# Patient Record
Sex: MALE | Race: WHITE | NOT HISPANIC OR LATINO | URBAN - METROPOLITAN AREA
[De-identification: names, ages, dates, MRNs, and addresses within clinical notes are randomized per-mention and may not be internally consistent; named-entity substitution may affect disease eponyms.]

---

## 2017-07-13 ENCOUNTER — RECORDS - HEALTHEAST (OUTPATIENT)
Dept: LAB | Facility: CLINIC | Age: 52
End: 2017-07-13

## 2017-07-13 LAB
CHOLEST SERPL-MCNC: 173 MG/DL
FASTING STATUS PATIENT QL REPORTED: NORMAL
HDLC SERPL-MCNC: 48 MG/DL
LDLC SERPL CALC-MCNC: 98 MG/DL
PSA SERPL-MCNC: 0.3 NG/ML (ref 0–3.5)
TRIGL SERPL-MCNC: 133 MG/DL

## 2019-06-20 ENCOUNTER — RECORDS - HEALTHEAST (OUTPATIENT)
Dept: ADMINISTRATIVE | Facility: OTHER | Age: 54
End: 2019-06-20

## 2019-06-20 ENCOUNTER — AMBULATORY - HEALTHEAST (OUTPATIENT)
Dept: VASCULAR SURGERY | Facility: CLINIC | Age: 54
End: 2019-06-20

## 2019-06-20 DIAGNOSIS — I83.892 VARICOSE VEINS OF LEG WITH SWELLING, LEFT: ICD-10-CM

## 2019-08-16 ENCOUNTER — AMBULATORY - HEALTHEAST (OUTPATIENT)
Dept: VASCULAR SURGERY | Facility: CLINIC | Age: 54
End: 2019-08-16

## 2019-08-20 ENCOUNTER — RECORDS - HEALTHEAST (OUTPATIENT)
Dept: VASCULAR ULTRASOUND | Facility: CLINIC | Age: 54
End: 2019-08-20

## 2019-08-20 ENCOUNTER — OFFICE VISIT - HEALTHEAST (OUTPATIENT)
Dept: VASCULAR SURGERY | Facility: CLINIC | Age: 54
End: 2019-08-20

## 2019-08-20 DIAGNOSIS — I87.2 VENOUS INSUFFICIENCY (CHRONIC) (PERIPHERAL): ICD-10-CM

## 2019-08-20 DIAGNOSIS — I83.899 VARICOSE VEINS OF UNSPECIFIED LOWER EXTREMITY WITH OTHER COMPLICATIONS: ICD-10-CM

## 2019-08-20 DIAGNOSIS — R60.9 SWELLING: ICD-10-CM

## 2019-08-20 DIAGNOSIS — I87.2 VENOUS INSUFFICIENCY OF BOTH LOWER EXTREMITIES: ICD-10-CM

## 2019-08-20 DIAGNOSIS — R60.9 EDEMA, UNSPECIFIED: ICD-10-CM

## 2019-08-20 DIAGNOSIS — I83.899 SYMPTOMATIC VARICOSE VEINS: ICD-10-CM

## 2019-08-20 ASSESSMENT — MIFFLIN-ST. JEOR: SCORE: 1990.65

## 2019-11-26 ENCOUNTER — OFFICE VISIT - HEALTHEAST (OUTPATIENT)
Dept: VASCULAR SURGERY | Facility: CLINIC | Age: 54
End: 2019-11-26

## 2019-11-26 ENCOUNTER — COMMUNICATION - HEALTHEAST (OUTPATIENT)
Dept: TELEHEALTH | Facility: CLINIC | Age: 54
End: 2019-11-26

## 2019-11-26 DIAGNOSIS — I87.2 VENOUS INSUFFICIENCY OF BOTH LOWER EXTREMITIES: ICD-10-CM

## 2019-11-26 DIAGNOSIS — I83.893 SYMPTOMATIC VARICOSE VEINS OF BOTH LOWER EXTREMITIES: ICD-10-CM

## 2019-11-26 RX ORDER — ATORVASTATIN CALCIUM 20 MG/1
20 TABLET, FILM COATED ORAL AT BEDTIME
Status: SHIPPED | COMMUNITY
Start: 2019-11-26

## 2019-12-17 ENCOUNTER — RECORDS - HEALTHEAST (OUTPATIENT)
Dept: LAB | Facility: CLINIC | Age: 54
End: 2019-12-17

## 2019-12-17 LAB
ALBUMIN SERPL-MCNC: 4.2 G/DL (ref 3.5–5)
ALP SERPL-CCNC: 79 U/L (ref 45–120)
ALT SERPL W P-5'-P-CCNC: 23 U/L (ref 0–45)
ANION GAP SERPL CALCULATED.3IONS-SCNC: 7 MMOL/L (ref 5–18)
AST SERPL W P-5'-P-CCNC: 61 U/L (ref 0–40)
BILIRUB SERPL-MCNC: 0.6 MG/DL (ref 0–1)
BUN SERPL-MCNC: 18 MG/DL (ref 8–22)
CALCIUM SERPL-MCNC: 9.4 MG/DL (ref 8.5–10.5)
CHLORIDE BLD-SCNC: 105 MMOL/L (ref 98–107)
CHOLEST SERPL-MCNC: 160 MG/DL
CO2 SERPL-SCNC: 28 MMOL/L (ref 22–31)
CREAT SERPL-MCNC: 1.8 MG/DL (ref 0.7–1.3)
FASTING STATUS PATIENT QL REPORTED: NORMAL
GFR SERPL CREATININE-BSD FRML MDRD: 40 ML/MIN/1.73M2
GLUCOSE BLD-MCNC: 84 MG/DL (ref 70–125)
HDLC SERPL-MCNC: 53 MG/DL
LDLC SERPL CALC-MCNC: 81 MG/DL
POTASSIUM BLD-SCNC: 4.2 MMOL/L (ref 3.5–5)
PROT SERPL-MCNC: 6.9 G/DL (ref 6–8)
SODIUM SERPL-SCNC: 140 MMOL/L (ref 136–145)
TRIGL SERPL-MCNC: 130 MG/DL

## 2019-12-19 ENCOUNTER — COMMUNICATION - HEALTHEAST (OUTPATIENT)
Dept: VASCULAR SURGERY | Facility: CLINIC | Age: 54
End: 2019-12-19

## 2020-01-02 ENCOUNTER — RECORDS - HEALTHEAST (OUTPATIENT)
Dept: VASCULAR ULTRASOUND | Facility: CLINIC | Age: 55
End: 2020-01-02

## 2020-01-02 DIAGNOSIS — I83.893 VARICOSE VEINS OF BILATERAL LOWER EXTREMITIES WITH OTHER COMPLICATIONS: ICD-10-CM

## 2020-01-02 DIAGNOSIS — I87.2 VENOUS INSUFFICIENCY (CHRONIC) (PERIPHERAL): ICD-10-CM

## 2020-01-21 ENCOUNTER — OFFICE VISIT - HEALTHEAST (OUTPATIENT)
Dept: VASCULAR SURGERY | Facility: CLINIC | Age: 55
End: 2020-01-21

## 2020-01-21 ENCOUNTER — COMMUNICATION - HEALTHEAST (OUTPATIENT)
Dept: VASCULAR SURGERY | Facility: CLINIC | Age: 55
End: 2020-01-21

## 2020-01-21 DIAGNOSIS — I83.893 SYMPTOMATIC VARICOSE VEINS OF BOTH LOWER EXTREMITIES: ICD-10-CM

## 2020-01-21 DIAGNOSIS — I87.2 VENOUS INSUFFICIENCY OF BOTH LOWER EXTREMITIES: ICD-10-CM

## 2020-05-05 ENCOUNTER — RECORDS - HEALTHEAST (OUTPATIENT)
Dept: LAB | Facility: CLINIC | Age: 55
End: 2020-05-05

## 2020-05-05 LAB
ALBUMIN SERPL-MCNC: 4.4 G/DL (ref 3.5–5)
ANION GAP SERPL CALCULATED.3IONS-SCNC: 9 MMOL/L (ref 5–18)
BUN SERPL-MCNC: 18 MG/DL (ref 8–22)
CALCIUM SERPL-MCNC: 9.4 MG/DL (ref 8.5–10.5)
CHLORIDE BLD-SCNC: 103 MMOL/L (ref 98–107)
CO2 SERPL-SCNC: 27 MMOL/L (ref 22–31)
CREAT SERPL-MCNC: 1.83 MG/DL (ref 0.7–1.3)
GFR SERPL CREATININE-BSD FRML MDRD: 39 ML/MIN/1.73M2
GLUCOSE BLD-MCNC: 74 MG/DL (ref 70–125)
PHOSPHATE SERPL-MCNC: 3.8 MG/DL (ref 2.5–4.5)
POTASSIUM BLD-SCNC: 4 MMOL/L (ref 3.5–5)
SODIUM SERPL-SCNC: 139 MMOL/L (ref 136–145)
TSH SERPL DL<=0.005 MIU/L-ACNC: 101.55 UIU/ML (ref 0.3–5)

## 2020-06-18 ENCOUNTER — RECORDS - HEALTHEAST (OUTPATIENT)
Dept: LAB | Facility: CLINIC | Age: 55
End: 2020-06-18

## 2020-06-18 LAB — TSH SERPL DL<=0.005 MIU/L-ACNC: 4.33 UIU/ML (ref 0.3–5)

## 2021-05-26 VITALS
SYSTOLIC BLOOD PRESSURE: 120 MMHG | HEART RATE: 72 BPM | RESPIRATION RATE: 12 BRPM | TEMPERATURE: 97.9 F | DIASTOLIC BLOOD PRESSURE: 70 MMHG

## 2021-05-27 VITALS
RESPIRATION RATE: 12 BRPM | DIASTOLIC BLOOD PRESSURE: 70 MMHG | SYSTOLIC BLOOD PRESSURE: 112 MMHG | HEART RATE: 72 BPM | TEMPERATURE: 97.9 F

## 2021-05-31 NOTE — PROGRESS NOTES
This is a new consult for Varicose Veins. The patient has varicose veins that are problematic in bilateral legs. Symptoms patient has been experiencing are  swelling. Patient has not been wearing compression stockings. Patient has not had recent imaging on legs done.'

## 2021-06-03 VITALS — BODY MASS INDEX: 27.16 KG/M2 | HEIGHT: 77 IN | WEIGHT: 230 LBS

## 2021-06-03 NOTE — PATIENT INSTRUCTIONS - HE
Varicose Vein Pre-Procedure Instructions    You are scheduled for a varicose vein treatment on your legs. The following is some helpful information for you in regards to your treatment.    **Important:  A  will be needed post procedure. We will supply a thigh high compression stocking for you unless if you have one.  Please be aware, it is not advised to fly within 3 weeks post procedure    Please wear comfortable clothing.  We recommend that you bring a change of under clothes; they may get stained by the cleansing solution.    Feel free to bring a personal music player or a CD to listen to during your procedure.    Take your routine medications as you normally would.    It is ok to eat prior to this procedure.    Please allow 1- 2 hours for your appointment.    For any questions regarding your procedure please call   [phone] to speak with the nurse.    If you would like a Good Nyla Estimate for your upcoming service/procedure contact Cost of Care Estimates at [phone], advocates are available Monday through Friday 8am - 5pm.  [id number] codes for veins if want to only do left code [id number]    Please have the following information available:  1. Patient name and date of birth  2. Insurance company, plan name, ID and group numbers  3. Description of the service/procedure and the associated procedure code numbers, if available. If more than one (1) procedure code, indicate which will be the primary procedure code.   4. The facility where the service/procedure will be performed.  5. The name of the physician involved with the service/procedure.  6. Appointment date of service.  7. Telephone number to call with the information.

## 2021-06-03 NOTE — PROGRESS NOTES
3 month f/u compression stockings with continues S/S symp VV,  - bilateral  GSV candidate for RFA reviewed.  Will preauth through [employer].L>R symp VV. May only do the left GSV RFA

## 2021-06-04 NOTE — TELEPHONE ENCOUNTER
Message left fot patient regarding vein ablation procedure next week. Advised to bring a . Explained it is ok to eat and drink prior to procedure with exception of blood thinner. Verified patient's insurance. We will supply patient with a thigh high compression sock. We carry from size medium to extra large. If patient doesn't fit into them they will need to provide their own.. Patient will call if any further questions.

## 2021-06-05 NOTE — PROGRESS NOTES
left GSV RFA f/u. DOS 12/30 vein continues but is improving  Resume normal activity with exception of no flight for one more week.  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  Return to clinic in 4 months.

## 2021-06-05 NOTE — PATIENT INSTRUCTIONS - HE
Resume normal activity  Use compression socks as much as possible when on feet, long car rides and on air planes.  Return to clinic in 4 months.  Call if any questions [phone]

## 2021-06-05 NOTE — PROGRESS NOTES
Post Procedure Note Endovenous Closure    S: Freddy Jones is a 54 y.o. male S/P Bilateral  leg endovenous closure ofBilateral lower ext. Two weeks out from procedure. Doing well, wore male  thigh high socks. Minimal discomfort. Some superficial phlibitis. Which is resolving.     O:   Vitals:    01/21/20 1208   BP: 112/70   Pulse: 72   Resp: 12   Temp: 97.9  F (36.6  C)       General: no apparent distress  Legs look good no signs of infection, incisions healing nicely.    US Venous Post Ablation Legs Bilateral (Order [id number])   Imaging   Date: 1/2/2020 Department: [facility]ound [place] Released By: Ira Potter Authorizing: Guille Porter MD   Study Result     LOCATION: [facility]  DATE: 1/2/2020     EXAM: US VENOUS POST ABLATION LEGS BILATERAL      INDICATION: Symptomatic varicose veins status post endovenous ablation.     COMPARISON: Venous insufficiency study dated 08/20/2019     TECHNIQUE: Duplex imaging is performed utilizing gray-scale, two-dimensional images, and color-flow imaging. Doppler waveform analysis and spectral Doppler imaging is also performed.     FINDINGS:   The left great saphenous vein is occluded beginning 2 mm from the saphenofemoral junction and extending to the proximal calf. Patent saphenofemoral junction.         A/P: S/p endovenous closure. For insuffiencey of veins     May switch to knee high support socks   Resume all activities   RTC 4 months   Call for any questions or concerns     Guille Porter MD   [facility]

## 2021-06-17 NOTE — PATIENT INSTRUCTIONS - HE
"Patient Instructions by Dione Moreno LPN at 8/20/2019  8:40 AM     Author: Dione Moreno LPN Service: -- Author Type: Licensed Nurse    Filed: 8/20/2019  9:06 AM Encounter Date: 8/20/2019 Status: Signed    : Dione Moreno LPN (Licensed Nurse)       We are prescribing some compression stockings for you. I have included different suppliers that should help you get measured and fitting to ensure proper fitting socks. You should wear these socks as much as you can. It is especially important to wear them with long periods of sitting/standing, long car rides or if you will be flying. Compression socks should get replaced every 4-6 months. They do not need to be worn at night while in bed.    If you do a lot of standing it is good to do calf raises to help keep the blood pumping. If you sit a lot at work it is good to get up periodically to walk around. Elevation of the foot of your bed 4-6\" helps the blood return back to where it is needed.    We would like you to follow up in 3 months after wearing compression socks to see how you are doing. Below is a list of locations where you can  the stockings.    Please call one of the locations below to schedule an appointment to get fitted for your compression stockings. If you received a prescription please bring it with you to your appointment. Some locations are limited to what they carry.    [facility]  [address]   [place] [address]   Phone: [phone]   Fax: [phone]    [facility]    [facility]  [address]  [place] [address]  Phone: [phone]    [facility]   [address] ([facility])  [place] [address]  Phone: [phone]    [facility]  [address]  [address]   [place] [address]   Phone: " [phone]    [facility]   [address]             [address]                 [address]     [place] [address]      [place] [address]         [place] [address]  Phone: [phone]      Phone: [phone]            Phone: [phone]  Fax: [phone]          Fax:[phone]                 Fax: [phone]                                     Naz Crispin  [phone]  [email or web address]      Understanding Spider and Varicose Veins    Do you often hide your legs because of the way they look? You may have noticed tiny red or blue bursts (spider veins). Or maybe you have veins that bulge or look twisted (varicose veins). If so, there are treatments that can help.    What Are the Symptoms?  Spider veins or varicose veins may never be a problem. But sometimes they can cause legs to ache or swell. Your legs may also feel heavy and tired, or like theyre burning. These symptoms may be more severe at the end of the day. Prolonged sitting or standing can also make your symptoms worse.        Who Gets Spider and Varicose Veins?  Anyone can get spider or varicose veins. But vein problems tend to be hereditary (run in families). Other factors that can affect veins include:    Pregnancy, hormones, and birth control pills    A job where you stand or sit a lot    Extra weight or lack of exercise    Age    What Can Be Done?  Spider and varicose veins can affect the way you feel about yourself. Talk to your doctor about your concerns. There are treatments that can ease symptoms and make your legs look better.  Your Treatment Options  Treatment may include self-care, sclerotherapy (injecting veins with a chemical), or surgery. Spider veins and some varicose veins can be treated with sclerotherapy. Large varicose veins may need surgery.    5870-8641 [employer]. [address] [place] [address], [place] [address]. All rights reserved. This information is not  intended as a substitute for professional medical care. Always follow your healthcare professional's instructions.      Ultrasound    Thank you for choosing [facility] as partners in your care.  Please read the following information before your appointment.  Feel free to ask questions.    An ultrasound is an exam that uses sound waves to create pictures.  The special camera that takes the pictures is called a transducer.  An ultrasound technologist will perform the exam under the direction of a physician.    Preparation  Ultrasound preps vary.  If you are scheduled for an Aorta Ultrasound, please do not eat or drink anything 8 hours before your exam.  You may take daily medications with a small sip of water.  There is no preparation required for any of the other ultrasound exams performed at [facility].    The Examination  You may or may not need to change clothes for your exam.  The technologist will explain the exam to you.  He or she will ask you a few questions and answer any questions you may have.    You will lie on a table and a gel will be applied to your skin.  A small handheld instrument called a transducer will be moved across the area we are looking at while pictures are taken.  Also, your exam may include measuring your blood pressure on your arms, legs and/or toes.    When the Exam Is Completed  Your physician will receive the results of the exam and explain them to you.  You may return to your normal diet and activities unless otherwise directed by your doctor.  Feel free to ask questions if something is not clear to you.  We welcome comments.    At [facility], we are dedicated to providing the best possible care.  Thank you for taking time to read these instructions.  We hope your experience is as pleasant as possible.      You are scheduled for the following exam(s):    [x]Venous Duplex:  Evaluates the veins that carry blood to the heart from the arms and/or legs.   Gel is applied to the skin of the arms and/or legs.  A transducer will be placed on the gel covered areas to obtain images and evaluate flow in the veins.  This exam takes approximately 30 minutes per arm/leg.    Self-Care for Spider and Varicose Veins    Your doctor may suggest that you try self-care. Exercising and maintaining a healthy weight may keep problem veins from getting worse. Wearing elastic stockings and elevating your legs can help improve blood flow. Taking breaks when you sit or stand helps, too.  Exercising        Exercising is good for your veins because it improves blood flow. Walking, cycling, or swimming are great exercises for vein health. But be sure to check with your doctor before starting any exercise program. Also, keep these hints in mind:    When exercising, start out slowly and try to build up to 30 minutes on most days.    Elevate your legs above heart level after exercise to keep blood from pooling in veins.  Maintaining a healthy weight  Being overweight puts extra pressure on your veins. To maintain a healthy weight, try these tips:    Choose lean meats, fish, and skinless chicken.    Use low-fat dairy products.    Eat foods high in fiber, such as whole grains, fruits, and vegetables.    Cut down on sugar, salt, and fats such as butter.    Exercise regularly.  Wearing elastic stockings  Elastic stockings gently squeeze veins so blood flows upward. If you need elastic stockings, your doctor can prescribe them for you. Follow your doctors advice about how and when to wear them. Elastic stockings should fit snugly.  Elevating your legs  Raising your legs above heart level will help relieve swelling and keep blood from pooling in veins. Try to elevate your legs for 15 to 20 minutes at the end of the day, and whenever youre relaxing. To make sure your legs are raised above heart level, prop them up on cushions or large pillows.  When sitting and standing  To keep blood moving when you  have to sit or stand for long periods, try these tips:    At work, take walking breaks instead of coffee breaks. Walk during your lunch hour. Or try flexing your feet up and down 10 times each hour.    When standing, raise yourself up and down on your toes, or rock back and forth on your heels.    8922-5366 [employer]. [address] [place] [address], [place] [address]. All rights reserved. This information is not intended as a substitute for professional medical care. Always follow your healthcare professional's instructions.

## 2021-06-19 NOTE — LETTER
Letter by Guille Porter MD at      Author: Guille Porter MD Service: -- Author Type: --    Filed:  Encounter Date: 11/26/2019 Status: Signed         Aime Nielsen MD  [address]  [facility] [address]                                  November 26, 2019    Patient: Freddy Jones   MR Number: [id number]   YOB: 1965   Date of Visit: 11/26/2019     Dear Dr. Gia MD:    Thank you for referring Freddy Jones to me for evaluation. Below are the relevant portions of my assessment and plan of care.    If you have questions, please do not hesitate to call me. I look forward to following Freddy along with you.    Sincerely,        Guille Porter MD          CC  No Recipients  Guille Porter MD  11/26/2019  9:40 AM  Sign when Signing Visit  Follow Up: Varicose Veins/ Venous Insufficiency    Freddy Jones is a 54 y.o.  male here for followup. He has worn stockings now for 3 months. I saw them previously in August 2019.  Continued progression of disease and symptoms and issues; reviewed ultrasound results. Patient has ongoing symptoms with pain and swelling needing intervention with pain meds secondary to interfering with daily activities and work. This now inhibits daily chores and activities.     Allergies:Patient has no known allergies.    Past Medical History:   Diagnosis Date   ? Chronic kidney disease    ? Hyperlipidemia    ? Lichen planus        Past Surgical History:   Procedure Laterality Date   ? RENAL BIOPSY     ? VASECTOMY  2002       CURRENT MEDS:  Current Outpatient Medications on File Prior to Visit   Medication Sig Dispense Refill   ? aspirin 81 mg chewable tablet Chew 81 mg daily.     ? atorvastatin (LIPITOR) 20 MG tablet Take 20 mg by mouth at bedtime.     ? cholecalciferol, vitamin D3, 1,000 unit tablet Take 1,000 Units by mouth daily.     ? OMEGA-3/DHA/EPA/FISH OIL (FISH OIL-OMEGA-3 FATTY ACIDS) 300-1,000 mg capsule Take 1,200 mg by mouth daily.      ? [DISCONTINUED] atorvastatin (LIPITOR) 20 MG tablet Take 20 mg by mouth bedtime.     ? [DISCONTINUED] lisinopril (PRINIVIL,ZESTRIL) 5 MG tablet Take 5 mg by mouth daily.       No current facility-administered medications on file prior to visit.        Family History   Problem Relation Age of Onset   ? Varicose Veins Mother    ? Diabetes Father    ? Varicose Veins Sister         reports that he has never smoked. He has never used smokeless tobacco. He reports current alcohol use.    Review of Systems:  Negative except continued progression of vein disease and symptoms of he has varicosities left much greater than right.  He has big large dilated varicosities entire leg on the left side with more swelling and issues there.  He has it on both sides causes pain symptoms especially stands for long length of time.  Socks have given him some relief but do not give any relief or above the sock areas in this area's continue to progress.  Causes troubles he stands for any length of time.  His normal activities at home and activities and chores of home life.  Such as washing dishes.  He is worn socks he wants to proceed with something type of intervention possible.  With and is here for that follow-up.  Otherwise normal state of health.  Rates his pain as a 6 out of 10    OBJECTIVE:  Vitals:    11/26/19 0804   BP: 120/70   Pulse: 72   Resp: 12   Temp: 97.9  F (36.6  C)     There is no height or weight on file to calculate BMI.    EXAM:  GENERAL: This is a well-developed 54 y.o. male who appears his stated age  HEAD: normocephalic  HEENT: Pupils equal and reactive bilaterally  CARDIAC: RRR without murmur  CHEST/LUNG:  Clear to auscultation  ABDOMEN: Soft, nontender, nondistended, no masses    NEUROLOGIC: Focally intact, nonfocal  VASCULAR: Pulses intact, symmetrical upper and lower extremities. Varicose veins, Spider veins and Chronic venous stasis changes, bilateral                    Imaging:    US Venous Insufficiency  Legs Bilateral (Order [id number])   Imaging   Date: 8/20/2019 Department: [facility] Ultrasound [place] Released By: Denton Ellison RDMS, RVT Authorizing: Guille Porter MD   Study Result     [facility]     DATE: 8/20/2019     EXAM: BILATERAL LOWER EXTREMITY DEEP AND SUPERFICIAL VENOUS DUPLEX ULTRASOUND WITH PHYSIOLOGIC TESTING     INDICATION: Symptomatic varicose veins. Assess for incompetent veins.     TECHNIQUE: Supine and upright ultrasound of the deep and superficial veins with Valsalva and compression augmentation maneuvers. Duplex imaging is performed utilizing gray-scale, two-dimensional images, color-flow imaging, Doppler waveform analysis, and   spectral Doppler imaging.      INCOMPETENCY CRITERIA: Deep vein reflux reported when greater than 1,000 ms flow reversal.  Superficial vein reflux reported when greater than 500 ms flow reversal.  vein reflux reported as greater than 350 ms flow reversal.     DEEP VEIN FINDINGS:     RIGHT LEG: The common femoral, profunda femoral, femoral, popliteal, and visualized calf veins are patent and compressible.  Deep venous reflux noted within the common femoral vein.     LEFT LEG:  The common femoral, profunda femoral, femoral, popliteal, and visualized calf veins are patent and compressible.   Deep venous reflux noted within the common femoral vein.     RIGHT SUPERFICIAL VEIN FINDINGS:  GREAT SAPHENOUS VEIN: Superficial venous reflux at the saphenofemoral junction. The vein measures 7 mm within this distribution. Superficial venous reflux within the greater saphenous vein at the proximal thigh and knee. The vein measures 4 to 6 mm   within this distribution.     SMALL SAPHENOUS VEIN: Competent from the saphenopopliteal junction to the mid calf.     No incompetent perforating veins or abnormal accessory veins identified.     LEFT SUPERFICIAL VEIN FINDINGS:  GREAT SAPHENOUS VEIN: Superficial venous reflux at the  saphenofemoral junction. The vein measures 9 mm within this distribution. Superficial venous reflux within the greater saphenous vein at the level of the proximal thigh and mid calf. The vein   measures 4 to 7 mm within this distribution.     SMALL SAPHENOUS VEIN: Competent from the saphenopopliteal junction to the mid calf.     No incompetent perforating veins or abnormal accessory veins identified.     IMPRESSION:   CONCLUSION:   1.  No deep venous thrombosis of either lower extremity.  2.  RIGHT LEG: Deep and superficial venous reflux, as described.  3.  LEFT LEG: Deep and superficial venous reflux, as described.         Assessment/Plan:    He has  incompetency and insuffiencey of the Bilateral  Greater  saphenous vein.  Right measures 6.9 mm at the junction, left measures 9.3 mm. Deep systems are intact. No DVTs. Great candidate for endovenous  closure. We spent counseling time more than 50% of visit: 20 minutes today and discussed the procedure. The risks of anesthesia, infection, bleeding, clotting, DVTs, numbess at the insertion site dermatome and  the process and procedure were discussed. Answered all questions today. Will submit this to Freddy Jones's insurance if needed for pre approval.Will set this up when approved. Discussed need to have a  and procedure woul take around 30 minutes with total time 2-3 hours. Also understands a small screening ultrasound 2-3 days out to rule out clot formation at the closed vessel.    DIAGNOSIS: Venous insufficiency of the  right greater saphenous vein and left greater saphenous vein      PROCEDURE: Endovenous closure of the right greater saphenous vein and left greater saphenous vein    Guille Porter MD  [facility] Surgery Dept.

## 2021-06-19 NOTE — LETTER
Letter by Guille Porter MD at      Author: Guille Porter MD Service: -- Author Type: --    Filed:  Encounter Date: 8/20/2019 Status: (Other)         Aime Nielsen MD  [address]  [place] [address]                                  August 26, 2019    Patient: Freddy Jones   MR Number: [id number]   YOB: 1965   Date of Visit: 8/20/2019     Dear Dr. Gia MD:    Thank you for referring Freddy Jones to me for evaluation. Below are the relevant portions of my assessment and plan of care.    If you have questions, please do not hesitate to call me. I look forward to following Freddy along with you.    Sincerely,        Guille Porter MD          CC  No Recipients  Guille Porter MD  8/26/2019  8:53 PM  Sign at close encounter  [facility]      Assessment:     1. varicose veins, bilateral, left greater than right  2. spider veins, bilateral, left greater than right  3. Swelling bilateral left greater than right  3. Insuffiencey of right greater saphenous vein, left greater saphenous vein       Plan:     1. Treatment options of conservative therapy of stockings use, exercise, weight loss, elevating legs  when possible.    2. Script for compression stockings 20-30 mm hg  3. Ultrasound to evaluate legs for incompetency of both deep and superficial system .   4. Surgical treatment   Endovenous closure ofbilateral, greater saphenous vein   Risks and benefits of surgical intervention including infection, burns, dvt, thrombophlebitis,  not closing, recurrence, numbness and nerve injury and need for further intervention were all  discused    5. Follow up: 3 months.   6. Call for any questions concerns or issues    Subjective:      Freddy Jones is a 54 y.o. male  who was referred by Aime Nielsen MD  for evaluation of varicose veins. Symptoms include pain, aching, fatigue, burning, edema and dermatitis. Patient has history of leg selling, pain  "and vein issues that have progressed. Pain and symptoms have affected daily living and work activities needing medications. Here for evaluation today. no stocking or compression devic use    Allergies:Patient has no known allergies.    Past Medical History:   Diagnosis Date   ? Chronic kidney disease    ? Hyperlipidemia    ? Lichen planus        Past Surgical History:   Procedure Laterality Date   ? RENAL BIOPSY     ? VASECTOMY  2002       Current Outpatient Medications   Medication Sig   ? aspirin 81 mg chewable tablet Chew 81 mg daily.   ? atorvastatin (LIPITOR) 20 MG tablet Take 20 mg by mouth bedtime.   ? cholecalciferol, vitamin D3, 1,000 unit tablet Take 1,000 Units by mouth daily.   ? lisinopril (PRINIVIL,ZESTRIL) 5 MG tablet Take 5 mg by mouth daily.   ? OMEGA-3/DHA/EPA/FISH OIL (FISH OIL-OMEGA-3 FATTY ACIDS) 300-1,000 mg capsule Take 1,200 mg by mouth daily.       Family History   Problem Relation Age of Onset   ? Varicose Veins Mother    ? Diabetes Father    ? Varicose Veins Sister         reports that he has never smoked. He has never used smokeless tobacco. He reports that he drinks alcohol.      Review of Systems  Pertinent items are noted in HPI.  A 12 point comprehensive review of systems was negative except as noted.      Objective:     Vitals:    08/20/19 0838   BP: 112/80   Pulse: (!) 56   Resp: 16   Temp: 98.2  F (36.8  C)   TempSrc: Oral   Weight: (!) 230 lb (104.3 kg)   Height: 6' 5\" (1.956 m)     Body mass index is 27.27 kg/m .    EXAM:  GENERAL: This is a well-developed 54 y.o. male who appears his stated age  HEAD: normocephalic  HEENT: Pupils equal and reactive bilaterally  MOUTH: mucus membranes intact. Normal dentation  CARDIAC: RRR without murmur  CHEST/LUNG:  Clear to auscultation bilaterally  ABDOMEN: Soft, nontender, nondistended, no masses noted   NEUROLOGIC: Focally intact, nonfocal, alert and oriented x 3  INTEGUMENT: No open lesions or ulcers  VASCULAR: Pulses intact, symmetrical " upper and lower extremities. There areskin changes consistent with chronic venous insufficiency. Varicose veins present in bilateral greater saphenous distribution. Spider veins present bilateral.                    Imaging:    US Venous Insufficiency Legs Bilateral (Order [id number])   Imaging   Date: 8/20/2019 Department: [facility] Ultrasound [place] Released By: Denton Ellison RDMS, RVT Authorizing: Guille Porter MD   Study Result     [facility] OUTPATIENT     DATE: 8/20/2019     EXAM: BILATERAL LOWER EXTREMITY DEEP AND SUPERFICIAL VENOUS DUPLEX ULTRASOUND WITH PHYSIOLOGIC TESTING     INDICATION: Symptomatic varicose veins. Assess for incompetent veins.     TECHNIQUE: Supine and upright ultrasound of the deep and superficial veins with Valsalva and compression augmentation maneuvers. Duplex imaging is performed utilizing gray-scale, two-dimensional images, color-flow imaging, Doppler waveform analysis, and   spectral Doppler imaging.      INCOMPETENCY CRITERIA: Deep vein reflux reported when greater than 1,000 ms flow reversal.  Superficial vein reflux reported when greater than 500 ms flow reversal.  vein reflux reported as greater than 350 ms flow reversal.     DEEP VEIN FINDINGS:     RIGHT LEG: The common femoral, profunda femoral, femoral, popliteal, and visualized calf veins are patent and compressible.  Deep venous reflux noted within the common femoral vein.     LEFT LEG:  The common femoral, profunda femoral, femoral, popliteal, and visualized calf veins are patent and compressible.   Deep venous reflux noted within the common femoral vein.     RIGHT SUPERFICIAL VEIN FINDINGS:  GREAT SAPHENOUS VEIN: Superficial venous reflux at the saphenofemoral junction. The vein measures 7 mm within this distribution. Superficial venous reflux within the greater saphenous vein at the proximal thigh and knee. The vein measures 4 to 6 mm   within this distribution.     SMALL  SAPHENOUS VEIN: Competent from the saphenopopliteal junction to the mid calf.     No incompetent perforating veins or abnormal accessory veins identified.     LEFT SUPERFICIAL VEIN FINDINGS:  GREAT SAPHENOUS VEIN: Superficial venous reflux at the saphenofemoral junction. The vein measures 9 mm within this distribution. Superficial venous reflux within the greater saphenous vein at the level of the proximal thigh and mid calf. The vein   measures 4 to 7 mm within this distribution.     SMALL SAPHENOUS VEIN: Competent from the saphenopopliteal junction to the mid calf.     No incompetent perforating veins or abnormal accessory veins identified.     IMPRESSION:   CONCLUSION:   1.  No deep venous thrombosis of either lower extremity.  2.  RIGHT LEG: Deep and superficial venous reflux, as described.  3.  LEFT LEG: Deep and superficial venous reflux, as described.         Guille Porter MD  [facility] Surgery Dept.

## 2021-06-20 NOTE — LETTER
Letter by Guille Porter MD at      Author: Guille Porter MD Service: -- Author Type: --    Filed:  Encounter Date: 1/21/2020 Status: (Other)         Aime Nielsen MD  [address]  [place] [facility] [address]                                  January 22, 2020    Patient: Freddy Jones   MR Number: [id number]   YOB: 1965   Date of Visit: 1/21/2020     Dear Dr. Gia MD:    Thank you for referring Freddy Jones to me for evaluation. Below are the relevant portions of my assessment and plan of care.    If you have questions, please do not hesitate to call me. I look forward to following Freddy along with you.    Sincerely,        Guille Porter MD          CC  No Recipients  Guille Porter MD  1/22/2020  3:41 PM  Sign when Signing Visit  Post Procedure Note Endovenous Closure    S: Freddy Jones is a 54 y.o. male S/P Bilateral  leg endovenous closure ofBilateral lower ext. Two weeks out from procedure. Doing well, wore male  thigh high socks. Minimal discomfort. Some superficial phlibitis. Which is resolving.     O:   Vitals:    01/21/20 1208   BP: 112/70   Pulse: 72   Resp: 12   Temp: 97.9  F (36.6  C)       General: no apparent distress  Legs look good no signs of infection, incisions healing nicely.    US Venous Post Ablation Legs Bilateral (Order [id number])   Imaging   Date: 1/2/2020 Department: [facility] Ultrasound [place] Released By: Ira Potter Authorizing: Guille Porter MD   Study Result     LOCATION: [facility]  DATE: 1/2/2020     EXAM: US VENOUS POST ABLATION LEGS BILATERAL      INDICATION: Symptomatic varicose veins status post endovenous ablation.     COMPARISON: Venous insufficiency study dated 08/20/2019     TECHNIQUE: Duplex imaging is performed utilizing gray-scale, two-dimensional images, and color-flow imaging. Doppler waveform analysis and spectral Doppler imaging is also  performed.     FINDINGS:   The left great saphenous vein is occluded beginning 2 mm from the saphenofemoral junction and extending to the proximal calf. Patent saphenofemoral junction.         A/P: S/p endovenous closure. For insuffiencey of veins     May switch to knee high support socks   Resume all activities   RTC 4 months   Call for any questions or concerns     Guille Porter MD   [facility] Surgery

## 2021-06-27 NOTE — PROGRESS NOTES
Progress Notes by Guille Porter MD at 8/20/2019  8:40 AM     Author: Guille Porter MD Service: -- Author Type: Physician    Filed: 8/26/2019  8:54 PM Encounter Date: 8/20/2019 Status: Signed    : Guille Porter MD (Physician)       [facility]      Assessment:     1. varicose veins, bilateral, left greater than right  2. spider veins, bilateral, left greater than right  3. Swelling bilateral left greater than right  3. Insuffiencey of right greater saphenous vein, left greater saphenous vein       Plan:     1. Treatment options of conservative therapy of stockings use, exercise, weight loss, elevating legs  when possible.    2. Script for compression stockings 20-30 mm hg  3. Ultrasound to evaluate legs for incompetency of both deep and superficial system .   4. Surgical treatment   Endovenous closure ofbilateral, greater saphenous vein   Risks and benefits of surgical intervention including infection, burns, dvt, thrombophlebitis,  not closing, recurrence, numbness and nerve injury and need for further intervention were all  discused    5. Follow up: 3 months.   6. Call for any questions concerns or issues    Subjective:      Freddy Jones is a 54 y.o. male  who was referred by Aime Nielsen MD  for evaluation of varicose veins. Symptoms include pain, aching, fatigue, burning, edema and dermatitis. Patient has history of leg selling, pain and vein issues that have progressed. Pain and symptoms have affected daily living and work activities needing medications. Here for evaluation today. no stocking or compression devic use    Allergies:Patient has no known allergies.    Past Medical History:   Diagnosis Date   ? Chronic kidney disease    ? Hyperlipidemia    ? Lichen planus        Past Surgical History:   Procedure Laterality Date   ? RENAL BIOPSY     ? VASECTOMY  2002       Current Outpatient Medications   Medication Sig   ? aspirin 81 mg chewable tablet Chew 81 mg daily.  "  ? atorvastatin (LIPITOR) 20 MG tablet Take 20 mg by mouth bedtime.   ? cholecalciferol, vitamin D3, 1,000 unit tablet Take 1,000 Units by mouth daily.   ? lisinopril (PRINIVIL,ZESTRIL) 5 MG tablet Take 5 mg by mouth daily.   ? OMEGA-3/DHA/EPA/FISH OIL (FISH OIL-OMEGA-3 FATTY ACIDS) 300-1,000 mg capsule Take 1,200 mg by mouth daily.       Family History   Problem Relation Age of Onset   ? Varicose Veins Mother    ? Diabetes Father    ? Varicose Veins Sister         reports that he has never smoked. He has never used smokeless tobacco. He reports that he drinks alcohol.      Review of Systems  Pertinent items are noted in HPI.  A 12 point comprehensive review of systems was negative except as noted.      Objective:     Vitals:    08/20/19 0838   BP: 112/80   Pulse: (!) 56   Resp: 16   Temp: 98.2  F (36.8  C)   TempSrc: Oral   Weight: (!) 230 lb (104.3 kg)   Height: 6' 5\" (1.956 m)     Body mass index is 27.27 kg/m .    EXAM:  GENERAL: This is a well-developed 54 y.o. male who appears his stated age  HEAD: normocephalic  HEENT: Pupils equal and reactive bilaterally  MOUTH: mucus membranes intact. Normal dentation  CARDIAC: RRR without murmur  CHEST/LUNG:  Clear to auscultation bilaterally  ABDOMEN: Soft, nontender, nondistended, no masses noted   NEUROLOGIC: Focally intact, nonfocal, alert and oriented x 3  INTEGUMENT: No open lesions or ulcers  VASCULAR: Pulses intact, symmetrical upper and lower extremities. There areskin changes consistent with chronic venous insufficiency. Varicose veins present in bilateral greater saphenous distribution. Spider veins present bilateral.                    Imaging:    US Venous Insufficiency Legs Bilateral (Order [id number])   Imaging   Date: 8/20/2019 Department: [facility] Ultrasound [place] Released By: Denton Ellison RDMS, RVT Authorizing: Guille Porter MD   Study Result     [facility] OUTPATIENT     DATE: 8/20/2019     EXAM: BILATERAL LOWER " EXTREMITY DEEP AND SUPERFICIAL VENOUS DUPLEX ULTRASOUND WITH PHYSIOLOGIC TESTING     INDICATION: Symptomatic varicose veins. Assess for incompetent veins.     TECHNIQUE: Supine and upright ultrasound of the deep and superficial veins with Valsalva and compression augmentation maneuvers. Duplex imaging is performed utilizing gray-scale, two-dimensional images, color-flow imaging, Doppler waveform analysis, and   spectral Doppler imaging.      INCOMPETENCY CRITERIA: Deep vein reflux reported when greater than 1,000 ms flow reversal.  Superficial vein reflux reported when greater than 500 ms flow reversal.  vein reflux reported as greater than 350 ms flow reversal.     DEEP VEIN FINDINGS:     RIGHT LEG: The common femoral, profunda femoral, femoral, popliteal, and visualized calf veins are patent and compressible.  Deep venous reflux noted within the common femoral vein.     LEFT LEG:  The common femoral, profunda femoral, femoral, popliteal, and visualized calf veins are patent and compressible.   Deep venous reflux noted within the common femoral vein.     RIGHT SUPERFICIAL VEIN FINDINGS:  GREAT SAPHENOUS VEIN: Superficial venous reflux at the saphenofemoral junction. The vein measures 7 mm within this distribution. Superficial venous reflux within the greater saphenous vein at the proximal thigh and knee. The vein measures 4 to 6 mm   within this distribution.     SMALL SAPHENOUS VEIN: Competent from the saphenopopliteal junction to the mid calf.     No incompetent perforating veins or abnormal accessory veins identified.     LEFT SUPERFICIAL VEIN FINDINGS:  GREAT SAPHENOUS VEIN: Superficial venous reflux at the saphenofemoral junction. The vein measures 9 mm within this distribution. Superficial venous reflux within the greater saphenous vein at the level of the proximal thigh and mid calf. The vein   measures 4 to 7 mm within this distribution.     SMALL SAPHENOUS VEIN: Competent from the  saphenopopliteal junction to the mid calf.     No incompetent perforating veins or abnormal accessory veins identified.     IMPRESSION:   CONCLUSION:   1.  No deep venous thrombosis of either lower extremity.  2.  RIGHT LEG: Deep and superficial venous reflux, as described.  3.  LEFT LEG: Deep and superficial venous reflux, as described.         Guille Porter MD  [facility] Surgery Dept.

## 2021-06-28 NOTE — PROGRESS NOTES
Progress Notes by Guille Porter MD at 11/26/2019  8:20 AM     Author: Guille Porter MD Service: -- Author Type: Physician    Filed: 11/26/2019  9:40 AM Encounter Date: 11/26/2019 Status: Signed    : Guille Porter MD (Physician)       Follow Up: Varicose Veins/ Venous Insufficiency    Freddy Jones is a 54 y.o.  male here for followup. He has worn stockings now for 3 months. I saw them previously in August 2019.  Continued progression of disease and symptoms and issues; reviewed ultrasound results. Patient has ongoing symptoms with pain and swelling needing intervention with pain meds secondary to interfering with daily activities and work. This now inhibits daily chores and activities.     Allergies:Patient has no known allergies.    Past Medical History:   Diagnosis Date   ? Chronic kidney disease    ? Hyperlipidemia    ? Lichen planus        Past Surgical History:   Procedure Laterality Date   ? RENAL BIOPSY     ? VASECTOMY  2002       CURRENT MEDS:  Current Outpatient Medications on File Prior to Visit   Medication Sig Dispense Refill   ? aspirin 81 mg chewable tablet Chew 81 mg daily.     ? atorvastatin (LIPITOR) 20 MG tablet Take 20 mg by mouth at bedtime.     ? cholecalciferol, vitamin D3, 1,000 unit tablet Take 1,000 Units by mouth daily.     ? OMEGA-3/DHA/EPA/FISH OIL (FISH OIL-OMEGA-3 FATTY ACIDS) 300-1,000 mg capsule Take 1,200 mg by mouth daily.     ? [DISCONTINUED] atorvastatin (LIPITOR) 20 MG tablet Take 20 mg by mouth bedtime.     ? [DISCONTINUED] lisinopril (PRINIVIL,ZESTRIL) 5 MG tablet Take 5 mg by mouth daily.       No current facility-administered medications on file prior to visit.        Family History   Problem Relation Age of Onset   ? Varicose Veins Mother    ? Diabetes Father    ? Varicose Veins Sister         reports that he has never smoked. He has never used smokeless tobacco. He reports current alcohol use.    Review of Systems:  Negative except continued  progression of vein disease and symptoms of he has varicosities left much greater than right.  He has big large dilated varicosities entire leg on the left side with more swelling and issues there.  He has it on both sides causes pain symptoms especially stands for long length of time.  Socks have given him some relief but do not give any relief or above the sock areas in this area's continue to progress.  Causes troubles he stands for any length of time.  His normal activities at home and activities and chores of home life.  Such as washing dishes.  He is worn socks he wants to proceed with something type of intervention possible.  With and is here for that follow-up.  Otherwise normal state of health.  Rates his pain as a 6 out of 10    OBJECTIVE:  Vitals:    11/26/19 0804   BP: 120/70   Pulse: 72   Resp: 12   Temp: 97.9  F (36.6  C)     There is no height or weight on file to calculate BMI.    EXAM:  GENERAL: This is a well-developed 54 y.o. male who appears his stated age  HEAD: normocephalic  HEENT: Pupils equal and reactive bilaterally  CARDIAC: RRR without murmur  CHEST/LUNG:  Clear to auscultation  ABDOMEN: Soft, nontender, nondistended, no masses    NEUROLOGIC: Focally intact, nonfocal  VASCULAR: Pulses intact, symmetrical upper and lower extremities. Varicose veins, Spider veins and Chronic venous stasis changes, bilateral                    Imaging:    US Venous Insufficiency Legs Bilateral (Order [id number])   Imaging   Date: 8/20/2019 Department: [facility] Ultrasound [place] Released By: Denton Ellison, SOTO, RVT Authorizing: Guille Porter MD   Study Result     [facility] OUTPATIENT     DATE: 8/20/2019     EXAM: BILATERAL LOWER EXTREMITY DEEP AND SUPERFICIAL VENOUS DUPLEX ULTRASOUND WITH PHYSIOLOGIC TESTING     INDICATION: Symptomatic varicose veins. Assess for incompetent veins.     TECHNIQUE: Supine and upright ultrasound of the deep and superficial veins with Valsalva  and compression augmentation maneuvers. Duplex imaging is performed utilizing gray-scale, two-dimensional images, color-flow imaging, Doppler waveform analysis, and   spectral Doppler imaging.      INCOMPETENCY CRITERIA: Deep vein reflux reported when greater than 1,000 ms flow reversal.  Superficial vein reflux reported when greater than 500 ms flow reversal.  vein reflux reported as greater than 350 ms flow reversal.     DEEP VEIN FINDINGS:     RIGHT LEG: The common femoral, profunda femoral, femoral, popliteal, and visualized calf veins are patent and compressible.  Deep venous reflux noted within the common femoral vein.     LEFT LEG:  The common femoral, profunda femoral, femoral, popliteal, and visualized calf veins are patent and compressible.   Deep venous reflux noted within the common femoral vein.     RIGHT SUPERFICIAL VEIN FINDINGS:  GREAT SAPHENOUS VEIN: Superficial venous reflux at the saphenofemoral junction. The vein measures 7 mm within this distribution. Superficial venous reflux within the greater saphenous vein at the proximal thigh and knee. The vein measures 4 to 6 mm   within this distribution.     SMALL SAPHENOUS VEIN: Competent from the saphenopopliteal junction to the mid calf.     No incompetent perforating veins or abnormal accessory veins identified.     LEFT SUPERFICIAL VEIN FINDINGS:  GREAT SAPHENOUS VEIN: Superficial venous reflux at the saphenofemoral junction. The vein measures 9 mm within this distribution. Superficial venous reflux within the greater saphenous vein at the level of the proximal thigh and mid calf. The vein   measures 4 to 7 mm within this distribution.     SMALL SAPHENOUS VEIN: Competent from the saphenopopliteal junction to the mid calf.     No incompetent perforating veins or abnormal accessory veins identified.     IMPRESSION:   CONCLUSION:   1.  No deep venous thrombosis of either lower extremity.  2.  RIGHT LEG: Deep and superficial venous reflux, as  described.  3.  LEFT LEG: Deep and superficial venous reflux, as described.         Assessment/Plan:    He has  incompetency and insuffiencey of the Bilateral  Greater  saphenous vein.  Right measures 6.9 mm at the junction, left measures 9.3 mm. Deep systems are intact. No DVTs. Great candidate for endovenous  closure. We spent counseling time more than 50% of visit: 20 minutes today and discussed the procedure. The risks of anesthesia, infection, bleeding, clotting, DVTs, numbess at the insertion site dermatome and  the process and procedure were discussed. Answered all questions today. Will submit this to Freddy Jones's insurance if needed for pre approval.Will set this up when approved. Discussed need to have a  and procedure woul take around 30 minutes with total time 2-3 hours. Also understands a small screening ultrasound 2-3 days out to rule out clot formation at the closed vessel.    DIAGNOSIS: Venous insufficiency of the  right greater saphenous vein and left greater saphenous vein      PROCEDURE: Endovenous closure of the right greater saphenous vein and left greater saphenous vein    Guille Porter MD  [facility] Surgery Dept.

## 2022-04-20 ENCOUNTER — LAB REQUISITION (OUTPATIENT)
Dept: LAB | Facility: CLINIC | Age: 57
End: 2022-04-20
Payer: COMMERCIAL

## 2022-04-20 DIAGNOSIS — E78.2 MIXED HYPERLIPIDEMIA: ICD-10-CM

## 2022-04-20 DIAGNOSIS — E03.9 HYPOTHYROIDISM, UNSPECIFIED: ICD-10-CM

## 2022-04-20 DIAGNOSIS — Z12.5 ENCOUNTER FOR SCREENING FOR MALIGNANT NEOPLASM OF PROSTATE: ICD-10-CM

## 2022-04-20 DIAGNOSIS — I12.9 HYPERTENSIVE CHRONIC KIDNEY DISEASE WITH STAGE 1 THROUGH STAGE 4 CHRONIC KIDNEY DISEASE, OR UNSPECIFIED CHRONIC KIDNEY DISEASE: ICD-10-CM

## 2022-04-20 LAB
ALBUMIN SERPL-MCNC: 4.2 G/DL (ref 3.5–5)
ALP SERPL-CCNC: 107 U/L (ref 45–120)
ALT SERPL W P-5'-P-CCNC: 36 U/L (ref 0–45)
ANION GAP SERPL CALCULATED.3IONS-SCNC: 14 MMOL/L (ref 5–18)
AST SERPL W P-5'-P-CCNC: 34 U/L (ref 0–40)
BILIRUB SERPL-MCNC: 0.7 MG/DL (ref 0–1)
BUN SERPL-MCNC: 18 MG/DL (ref 8–22)
CALCIUM SERPL-MCNC: 9.7 MG/DL (ref 8.5–10.5)
CHLORIDE BLD-SCNC: 107 MMOL/L (ref 98–107)
CHOLEST SERPL-MCNC: 200 MG/DL
CO2 SERPL-SCNC: 19 MMOL/L (ref 22–31)
CREAT SERPL-MCNC: 1.32 MG/DL (ref 0.7–1.3)
GFR SERPL CREATININE-BSD FRML MDRD: 63 ML/MIN/1.73M2
GLUCOSE BLD-MCNC: 95 MG/DL (ref 70–125)
HDLC SERPL-MCNC: 48 MG/DL
LDLC SERPL CALC-MCNC: 107 MG/DL
POTASSIUM BLD-SCNC: 4.9 MMOL/L (ref 3.5–5)
PROT SERPL-MCNC: 7.6 G/DL (ref 6–8)
PSA SERPL-MCNC: 0.6 UG/L (ref 0–3.5)
SODIUM SERPL-SCNC: 140 MMOL/L (ref 136–145)
TRIGL SERPL-MCNC: 227 MG/DL
TSH SERPL DL<=0.005 MIU/L-ACNC: 3.98 UIU/ML (ref 0.3–5)

## 2022-04-20 PROCEDURE — 80053 COMPREHEN METABOLIC PANEL: CPT | Mod: ORL | Performed by: FAMILY MEDICINE

## 2022-04-20 PROCEDURE — 84443 ASSAY THYROID STIM HORMONE: CPT | Mod: ORL | Performed by: FAMILY MEDICINE

## 2022-04-20 PROCEDURE — 80061 LIPID PANEL: CPT | Mod: ORL | Performed by: FAMILY MEDICINE

## 2022-04-20 PROCEDURE — G0103 PSA SCREENING: HCPCS | Mod: ORL | Performed by: FAMILY MEDICINE

## 2023-08-04 ENCOUNTER — LAB REQUISITION (OUTPATIENT)
Dept: LAB | Facility: CLINIC | Age: 58
End: 2023-08-04

## 2023-08-04 DIAGNOSIS — E78.2 MIXED HYPERLIPIDEMIA: ICD-10-CM

## 2023-08-04 DIAGNOSIS — E03.9 HYPOTHYROIDISM, UNSPECIFIED: ICD-10-CM

## 2023-08-04 DIAGNOSIS — Z12.5 ENCOUNTER FOR SCREENING FOR MALIGNANT NEOPLASM OF PROSTATE: ICD-10-CM

## 2023-08-04 LAB
ALBUMIN SERPL BCG-MCNC: 4.9 G/DL (ref 3.5–5.2)
ALP SERPL-CCNC: 107 U/L (ref 40–129)
ALT SERPL W P-5'-P-CCNC: 44 U/L (ref 0–70)
ANION GAP SERPL CALCULATED.3IONS-SCNC: 11 MMOL/L (ref 7–15)
AST SERPL W P-5'-P-CCNC: 33 U/L (ref 0–45)
BILIRUB SERPL-MCNC: 0.5 MG/DL
BUN SERPL-MCNC: 17.8 MG/DL (ref 6–20)
CALCIUM SERPL-MCNC: 9.7 MG/DL (ref 8.6–10)
CHLORIDE SERPL-SCNC: 103 MMOL/L (ref 98–107)
CHOLEST SERPL-MCNC: 207 MG/DL
CREAT SERPL-MCNC: 1.21 MG/DL (ref 0.67–1.17)
DEPRECATED HCO3 PLAS-SCNC: 25 MMOL/L (ref 22–29)
GFR SERPL CREATININE-BSD FRML MDRD: 69 ML/MIN/1.73M2
GLUCOSE SERPL-MCNC: 96 MG/DL (ref 70–99)
HDLC SERPL-MCNC: 50 MG/DL
LDLC SERPL CALC-MCNC: 124 MG/DL
NONHDLC SERPL-MCNC: 157 MG/DL
POTASSIUM SERPL-SCNC: 4.6 MMOL/L (ref 3.4–5.3)
PROT SERPL-MCNC: 7.2 G/DL (ref 6.4–8.3)
PSA SERPL DL<=0.01 NG/ML-MCNC: 0.87 NG/ML (ref 0–3.5)
SODIUM SERPL-SCNC: 139 MMOL/L (ref 136–145)
TRIGL SERPL-MCNC: 166 MG/DL
TSH SERPL DL<=0.005 MIU/L-ACNC: 5.02 UIU/ML (ref 0.3–4.2)

## 2023-08-04 PROCEDURE — 80061 LIPID PANEL: CPT | Performed by: FAMILY MEDICINE

## 2023-08-04 PROCEDURE — 80053 COMPREHEN METABOLIC PANEL: CPT | Performed by: FAMILY MEDICINE

## 2023-08-04 PROCEDURE — G0103 PSA SCREENING: HCPCS | Performed by: FAMILY MEDICINE

## 2023-08-04 PROCEDURE — 84443 ASSAY THYROID STIM HORMONE: CPT | Performed by: FAMILY MEDICINE

## 2024-04-25 ENCOUNTER — LAB REQUISITION (OUTPATIENT)
Dept: LAB | Facility: CLINIC | Age: 59
End: 2024-04-25

## 2024-04-25 DIAGNOSIS — E03.9 HYPOTHYROIDISM, UNSPECIFIED: ICD-10-CM

## 2024-04-25 PROCEDURE — 84443 ASSAY THYROID STIM HORMONE: CPT | Performed by: PHYSICIAN ASSISTANT

## 2024-04-26 LAB — TSH SERPL DL<=0.005 MIU/L-ACNC: 2.11 UIU/ML (ref 0.3–4.2)

## 2024-10-07 ENCOUNTER — LAB REQUISITION (OUTPATIENT)
Dept: LAB | Facility: CLINIC | Age: 59
End: 2024-10-07

## 2024-10-07 DIAGNOSIS — E78.2 MIXED HYPERLIPIDEMIA: ICD-10-CM

## 2024-10-07 LAB
CHOLEST SERPL-MCNC: 178 MG/DL
FASTING STATUS PATIENT QL REPORTED: NORMAL
HDLC SERPL-MCNC: 56 MG/DL
LDLC SERPL CALC-MCNC: 96 MG/DL
NONHDLC SERPL-MCNC: 122 MG/DL
TRIGL SERPL-MCNC: 128 MG/DL

## 2024-10-07 PROCEDURE — 82465 ASSAY BLD/SERUM CHOLESTEROL: CPT | Performed by: FAMILY MEDICINE
